# Patient Record
Sex: FEMALE | Race: WHITE | NOT HISPANIC OR LATINO | Employment: OTHER | URBAN - METROPOLITAN AREA
[De-identification: names, ages, dates, MRNs, and addresses within clinical notes are randomized per-mention and may not be internally consistent; named-entity substitution may affect disease eponyms.]

---

## 2017-01-05 ENCOUNTER — TELEPHONE (OUTPATIENT)
Dept: GASTROENTEROLOGY | Facility: CLINIC | Age: 77
End: 2017-01-05

## 2017-01-05 NOTE — TELEPHONE ENCOUNTER
Let pt know that I received the pathology report from [facility] of her esophagus bx which was normal.    She should continue to take the pantoprazole and follow up in one month to discuss symptoms and further treatment or workup if needed.